# Patient Record
Sex: MALE | Race: WHITE | NOT HISPANIC OR LATINO | Employment: FULL TIME | URBAN - METROPOLITAN AREA
[De-identification: names, ages, dates, MRNs, and addresses within clinical notes are randomized per-mention and may not be internally consistent; named-entity substitution may affect disease eponyms.]

---

## 2022-06-13 ENCOUNTER — OFFICE VISIT (OUTPATIENT)
Dept: URGENT CARE | Facility: PHYSICIAN GROUP | Age: 22
End: 2022-06-13
Payer: COMMERCIAL

## 2022-06-13 VITALS
HEIGHT: 72 IN | HEART RATE: 90 BPM | BODY MASS INDEX: 29.74 KG/M2 | DIASTOLIC BLOOD PRESSURE: 76 MMHG | SYSTOLIC BLOOD PRESSURE: 106 MMHG | WEIGHT: 219.6 LBS | RESPIRATION RATE: 20 BRPM | TEMPERATURE: 97.8 F | OXYGEN SATURATION: 97 %

## 2022-06-13 DIAGNOSIS — J98.8 VIRAL RESPIRATORY ILLNESS: ICD-10-CM

## 2022-06-13 DIAGNOSIS — B97.89 VIRAL RESPIRATORY ILLNESS: ICD-10-CM

## 2022-06-13 PROCEDURE — 99203 OFFICE O/P NEW LOW 30 MIN: CPT | Performed by: PHYSICIAN ASSISTANT

## 2022-06-13 RX ORDER — DEXAMETHASONE SODIUM PHOSPHATE 10 MG/ML
10 INJECTION INTRAMUSCULAR; INTRAVENOUS ONCE
Status: COMPLETED | OUTPATIENT
Start: 2022-06-13 | End: 2022-06-13

## 2022-06-13 RX ADMIN — DEXAMETHASONE SODIUM PHOSPHATE 10 MG: 10 INJECTION INTRAMUSCULAR; INTRAVENOUS at 09:56

## 2022-06-13 ASSESSMENT — ENCOUNTER SYMPTOMS
NAUSEA: 0
WHEEZING: 0
EYE DISCHARGE: 0
COUGH: 0
EYE REDNESS: 0
DIAPHORESIS: 0
CONSTIPATION: 0
EYE PAIN: 0
SORE THROAT: 1
VOMITING: 0
SINUS PAIN: 0
ABDOMINAL PAIN: 0
DIARRHEA: 0
EYES NEGATIVE: 1
FEVER: 0
CARDIOVASCULAR NEGATIVE: 1
HEADACHES: 1
SHORTNESS OF BREATH: 0
DIZZINESS: 0
CHILLS: 0

## 2022-06-13 NOTE — LETTER
[facility] [place]  [address]  [place] [address]     June 13, 2022    Patient: Elvis Cordova Jr.   YOB: 2000   Date of Visit: 6/13/2022       To Whom It May Concern:    Elvis Cordova was seen and treated in our department on 6/13/2022. Please excuse from work between 6/10/2022-6/14/2022. Can return to work on the morning of 6/15/2022.    Sincerely,     Jose Manuel Golden P.A.-C.

## 2022-06-13 NOTE — PROGRESS NOTES
Subjective:     Elvis Cordova Jr.  is a 21 y.o. male who presents for Cough (Sore throat, runny nose, headache, sore back,  x4 days )       He presents today with sore throat, rhinorrhea, headache, body aches that have been ongoing since 6/10/2022.  He notes that his mother has similar symptoms to him.  He did take an at home COVID test yesterday, negative.  Does have a history of asthma.  Denies fever/chills/sweats, chest pain shortness of breath, abdominal pain, nausea/vomiting, constipation/diarrhea.  He is needing a work note to return to work.     Review of Systems   Constitutional: Positive for malaise/fatigue. Negative for chills, diaphoresis and fever.   HENT: Positive for sore throat. Negative for congestion, ear discharge and sinus pain.    Eyes: Negative.  Negative for pain, discharge and redness.   Respiratory: Negative for cough, shortness of breath and wheezing.    Cardiovascular: Negative.  Negative for chest pain.   Gastrointestinal: Negative for abdominal pain, constipation, diarrhea, nausea and vomiting.   Genitourinary: Negative for dysuria, frequency and urgency.   Neurological: Positive for headaches. Negative for dizziness.      Not on File  History reviewed. No pertinent past medical history.     Objective:   /76 (BP Location: Left arm, Patient Position: Sitting, BP Cuff Size: Adult)   Pulse 90   Temp 36.6 °C (97.8 °F) (Temporal)   Resp 20   Ht 1.829 m (6')   Wt 99.6 kg (219 lb 9.6 oz)   SpO2 97%   BMI 29.78 kg/m²   Physical Exam  Vitals and nursing note reviewed.   Constitutional:       General: He is not in acute distress.     Appearance: Normal appearance. He is not ill-appearing, toxic-appearing or diaphoretic.   HENT:      Head: Normocephalic.      Right Ear: Tympanic membrane, ear canal and external ear normal. There is no impacted cerumen.      Left Ear: Tympanic membrane, ear canal and external ear normal. There is no impacted cerumen.      Nose: No congestion or  rhinorrhea.      Mouth/Throat:      Mouth: Mucous membranes are moist.      Pharynx: Posterior oropharyngeal erythema present. No oropharyngeal exudate.   Eyes:      General:         Right eye: No discharge.         Left eye: No discharge.      Conjunctiva/sclera: Conjunctivae normal.   Cardiovascular:      Rate and Rhythm: Normal rate and regular rhythm.   Pulmonary:      Effort: Pulmonary effort is normal. No respiratory distress.      Breath sounds: Normal breath sounds. No stridor. No wheezing or rhonchi.   Musculoskeletal:      Cervical back: Neck supple.   Lymphadenopathy:      Cervical: No cervical adenopathy.   Neurological:      General: No focal deficit present.      Mental Status: He is alert and oriented to person, place, and time.   Psychiatric:         Mood and Affect: Mood normal.         Behavior: Behavior normal.         Thought Content: Thought content normal.         Judgment: Judgment normal.             Diagnostic testing: None    Assessment/Plan:     Encounter Diagnoses   Name Primary?   • Viral respiratory illness         Plan for care for today's complaint includes Decadron drink in office.  At this time patient does decline COVID test as he did have a negative test at home yesterday.  I do not feel that a influenza swab would be needed at this time as he is outside the treatment window for antiviral medications for influenza.  Did provide a work note to the patient today.  He should continue to monitor his symptoms, if they are not improved in the next 7-10 days or they begin to significantly worsen that he should return to the urgent care or emergency department for reevaluation.    See AVS Instructions below for written guidance provided to patient on after-visit management and care in addition to our verbal discussion during the visit.    Please note that this dictation was created using voice recognition software. I have attempted to correct all errors, but there may be sound-alike,  spelling, grammar and possibly content errors that I did not discover before finalizing the note.    Emetvaleria Golden PA-C

## 2022-08-26 ENCOUNTER — OFFICE VISIT (OUTPATIENT)
Dept: URGENT CARE | Facility: PHYSICIAN GROUP | Age: 22
End: 2022-08-26
Payer: COMMERCIAL

## 2022-08-26 VITALS
DIASTOLIC BLOOD PRESSURE: 80 MMHG | HEIGHT: 72 IN | WEIGHT: 213.4 LBS | OXYGEN SATURATION: 98 % | TEMPERATURE: 98.4 F | RESPIRATION RATE: 16 BRPM | SYSTOLIC BLOOD PRESSURE: 104 MMHG | HEART RATE: 82 BPM | BODY MASS INDEX: 28.91 KG/M2

## 2022-08-26 DIAGNOSIS — R11.0 NAUSEA: ICD-10-CM

## 2022-08-26 DIAGNOSIS — A08.4 VIRAL GASTROENTERITIS: ICD-10-CM

## 2022-08-26 PROCEDURE — 99213 OFFICE O/P EST LOW 20 MIN: CPT | Performed by: NURSE PRACTITIONER

## 2022-08-26 RX ORDER — ONDANSETRON 4 MG/1
4 TABLET, ORALLY DISINTEGRATING ORAL EVERY 6 HOURS PRN
Qty: 15 TABLET | Refills: 0 | Status: SHIPPED | OUTPATIENT
Start: 2022-08-26 | End: 2022-09-09

## 2022-08-26 NOTE — LETTER
August 26, 2022    To Whom It May Concern:         This is confirmation that Elvis Cordova Jr. attended his scheduled appointment with BEVERLY Wagner on 8/26/22. Please excuse 8/24-8/26/22. <ay return 8/29/22.          If you have any questions please do not hesitate to call me at the phone number listed below.    Sincerely,          DEMI Wagner.  [phone]

## 2022-08-26 NOTE — PROGRESS NOTES
Patient has consented to treatment and for use of patient information for treatment and billing purposes.    Date: 08/26/22     Arrival Mode: Private Vehicle    Chief Complaint:    Chief Complaint   Patient presents with    GI Problem     Nausea,headaches,stomach ache for a couple days        Patient's HPI was provided by patient       History of Present Illness: 21 y.o.  male presents to clinic with 3-day history of nausea, stomachache, body aches and mild headache.  This is not the worst headache of the patient's life.  Patient states that an empty stomach causes abdominal cramping and causes the ache.  Patient states when he is able to eat a little bit it does help with the stomach cramping. Patient states he did have to miss work for 3 days and needs a work note.  Patient states that he does not have any focal abdominal pain.   No diarrhea last bowel movement yesterday was not bloody or black and was normal for him.  Denies any constipation.  Denies any vomiting.  No decreased urination or dysuria.  No known sick contacts no drinking from well [place] streams or recent camping.  No questionable foods or recent travel.       ROS:      As per hpi     Pertinent Medical History:  No past medical history on file.     Pertinent Surgical History:  No past surgical history on file.     Pertinent Medications:    No current outpatient medications on file prior to visit.     No current facility-administered medications on file prior to visit.        Allergies:    Patient has no allergy information on record.     Social History:  Social History     Tobacco Use    Smoking status: Never    Smokeless tobacco: Never        No LMP for male patient.         Physical Exam:    Vitals:    08/26/22 0953   BP: 104/80   Pulse: 82   Temp: 36.9 °C (98.4 °F)   SpO2: 98%             Physical Exam  Constitutional:       General: He is awake.      Appearance: Normal appearance. He is not ill-appearing, toxic-appearing or diaphoretic.   HENT:       Head: Normocephalic and atraumatic.      Right Ear: Tympanic membrane, ear canal and external ear normal.      Left Ear: Tympanic membrane, ear canal and external ear normal.   Eyes:      General: Lids are normal. Gaze aligned appropriately. No allergic shiner or scleral icterus.     Extraocular Movements: Extraocular movements intact.      Conjunctiva/sclera: Conjunctivae normal.      Pupils: Pupils are equal, round, and reactive to light.   Cardiovascular:      Rate and Rhythm: Normal rate and regular rhythm.      Pulses:           Radial pulses are 2+ on the right side and 2+ on the left side.      Heart sounds: Normal heart sounds.   Pulmonary:      Effort: Pulmonary effort is normal.      Breath sounds: Normal breath sounds and air entry. No decreased breath sounds, wheezing, rhonchi or rales.   Abdominal:      General: Abdomen is flat. Bowel sounds are normal.      Palpations: Abdomen is soft.      Tenderness: There is no abdominal tenderness. There is no right CVA tenderness, left CVA tenderness, guarding or rebound.   Musculoskeletal:      Right lower leg: No edema.      Left lower leg: No edema.   Lymphadenopathy:      Cervical: No cervical adenopathy.   Skin:     General: Skin is warm.      Capillary Refill: Capillary refill takes less than 2 seconds.      Coloration: Skin is not cyanotic or pale.   Neurological:      Mental Status: He is alert and oriented to person, place, and time.      Gait: Gait is intact.   Psychiatric:         Behavior: Behavior normal. Behavior is cooperative.          Diagnostics:    None       Medical Decision making and clinic course :    Shared decision-making was utilized with patient for treatment plan.  Discussed likely viral etiology as a cause of symptoms although did discuss other differentials to include acute abdomen, ulcer, food intolerance and/or cholecystitis.  Do have low suspicion for these differentials as exam findings are reassuring as well as HPI.   Fortunately patient does have a primary care follow-up in approximately 1 week's time.  We will send for Zofran for nausea encouraged bland diet and electrolyte replacement drinks.  Did give anticipatory guidance that he may begin to develop diarrhea and encouraged him to increase fluids at that time.        The patient remained stable during the urgent care visit.    Plan:    Medication discussed included indication for use and the potential benefits and side effects.       1. Nausea    - ondansetron (ZOFRAN ODT) 4 MG TABLET DISPERSIBLE; Take 1 Tablet by mouth every 6 hours as needed for Nausea for up to 15 doses.  Dispense: 15 Tablet; Refill: 0    2. Viral gastroenteritis        Printed education was provided regarding the aforementioned assessments.  All of the patient's questions were answered to their satisfaction at the time of discharge.    Follow up:    Recommended f/u in  3 days if there is no improvement.    Patient was encouraged to monitor symptoms closely. Those signs and symptoms which would warrant concern and mandate seeking a higher level of service through the emergency department discussed at length and included in discharge papers.  Patient stated agreement and understanding of this plan of care.    Disposition:  Home in stable condition       Voice Recognition Disclaimer:  Portions of this document were created using voice recognition software. The software does have a chance of producing errors of grammar and possibly content. I have made every reasonable attempt to correct obvious errors, but there may be errors of grammar and possibly content that I did not discover before finalizing the documentation.    DEMI Wagner.

## 2022-09-09 ENCOUNTER — OFFICE VISIT (OUTPATIENT)
Dept: MEDICAL GROUP | Facility: PHYSICIAN GROUP | Age: 22
End: 2022-09-09
Payer: COMMERCIAL

## 2022-09-09 VITALS
WEIGHT: 217.4 LBS | BODY MASS INDEX: 29.45 KG/M2 | DIASTOLIC BLOOD PRESSURE: 78 MMHG | OXYGEN SATURATION: 100 % | HEIGHT: 72 IN | TEMPERATURE: 98.2 F | HEART RATE: 80 BPM | RESPIRATION RATE: 16 BRPM | SYSTOLIC BLOOD PRESSURE: 110 MMHG

## 2022-09-09 DIAGNOSIS — Z00.00 WELLNESS EXAMINATION: ICD-10-CM

## 2022-09-09 DIAGNOSIS — Z23 NEED FOR VACCINATION: ICD-10-CM

## 2022-09-09 DIAGNOSIS — J45.20 MILD INTERMITTENT ASTHMA WITHOUT COMPLICATION: ICD-10-CM

## 2022-09-09 PROCEDURE — 90715 TDAP VACCINE 7 YRS/> IM: CPT

## 2022-09-09 PROCEDURE — 90472 IMMUNIZATION ADMIN EACH ADD: CPT

## 2022-09-09 PROCEDURE — 90471 IMMUNIZATION ADMIN: CPT

## 2022-09-09 PROCEDURE — 90651 9VHPV VACCINE 2/3 DOSE IM: CPT

## 2022-09-09 PROCEDURE — 99395 PREV VISIT EST AGE 18-39: CPT | Mod: 25

## 2022-09-09 RX ORDER — ALBUTEROL SULFATE 90 UG/1
2 AEROSOL, METERED RESPIRATORY (INHALATION) EVERY 6 HOURS PRN
Qty: 8.5 G | Refills: 1 | Status: SHIPPED | OUTPATIENT
Start: 2022-09-09

## 2022-09-09 ASSESSMENT — ENCOUNTER SYMPTOMS
HEARTBURN: 0
NERVOUS/ANXIOUS: 0
HEADACHES: 0
DIARRHEA: 0
CHILLS: 0
SHORTNESS OF BREATH: 0
COUGH: 0
WEAKNESS: 0
MYALGIAS: 0
ABDOMINAL PAIN: 0
NECK PAIN: 0
CONSTIPATION: 0
PALPITATIONS: 0
BACK PAIN: 0
FEVER: 0
DEPRESSION: 0
WHEEZING: 0

## 2022-09-09 ASSESSMENT — PATIENT HEALTH QUESTIONNAIRE - PHQ9: CLINICAL INTERPRETATION OF PHQ2 SCORE: 0

## 2022-09-09 NOTE — ASSESSMENT & PLAN NOTE
Chronic condition since childhood, very seldom has any symptoms now. Has not had any difficulty breathing, wheezing, or chest tightness in over 3 years. Only notices with URI.

## 2022-09-09 NOTE — PROGRESS NOTES
Subjective:     CC: Patient presents to establish care.    HPI:   Elvis presents today with    Problem   Mild Intermittent Asthma Without Complication       Health Maintenance: Tdap and HPV vaccines provided, will follow up for Hep B and Men B  Diet: variety of fresh veggies/fruits, lean meats, limited fast food/junk food/soda  Exercise: encouraged 150 minutes/week.   Substance Abuse: no  Safe in relationship. N/a  Seat belts, bike helmet, gun safety discussed.  Sun protection used.    ROS:  Review of Systems   Constitutional:  Negative for chills and fever.   Respiratory:  Negative for cough, shortness of breath and wheezing.    Cardiovascular:  Negative for chest pain, palpitations and leg swelling.   Gastrointestinal:  Negative for abdominal pain, constipation, diarrhea and heartburn.   Musculoskeletal:  Negative for back pain, myalgias and neck pain.   Skin:  Negative for rash.   Neurological:  Negative for weakness and headaches.   Psychiatric/Behavioral:  Negative for depression. The patient is not nervous/anxious.    All other systems reviewed and are negative.    Objective:     Exam:  /78 (BP Location: Right arm, Patient Position: Sitting, BP Cuff Size: Small adult)   Pulse 80   Temp 36.8 °C (98.2 °F) (Temporal)   Resp 16   Ht 1.829 m (6')   Wt 98.6 kg (217 lb 6.4 oz)   SpO2 100%   BMI 29.48 kg/m²  Body mass index is 29.48 kg/m².    Physical Exam  Vitals reviewed.   Constitutional:       General: He is not in acute distress.     Appearance: Normal appearance. He is not ill-appearing.   HENT:      Head: Normocephalic and atraumatic.      Right Ear: Tympanic membrane, ear canal and external ear normal.      Left Ear: Tympanic membrane, ear canal and external ear normal.      Nose:      Comments: Mask in place     Mouth/Throat:      Comments: Mask in place  Eyes:      Extraocular Movements: Extraocular movements intact.      Conjunctiva/sclera: Conjunctivae normal.      Pupils: Pupils are equal,  round, and reactive to light.   Cardiovascular:      Rate and Rhythm: Normal rate and regular rhythm.      Pulses: Normal pulses.      Heart sounds: Normal heart sounds. No murmur heard.    No gallop.   Pulmonary:      Effort: Pulmonary effort is normal. No respiratory distress.      Breath sounds: Normal breath sounds. No wheezing or rales.   Abdominal:      General: Bowel sounds are normal.      Palpations: Abdomen is soft.   Musculoskeletal:         General: Normal range of motion.      Cervical back: Normal range of motion and neck supple.   Lymphadenopathy:      Cervical: No cervical adenopathy.   Skin:     General: Skin is warm and dry.      Capillary Refill: Capillary refill takes less than 2 seconds.   Neurological:      General: No focal deficit present.      Mental Status: He is alert and oriented to person, place, and time.      Motor: No weakness.   Psychiatric:         Mood and Affect: Mood normal.         Behavior: Behavior normal.       Assessment & Plan:     22 y.o. male with the following -     Problem List Items Addressed This Visit       Mild intermittent asthma without complication     Chronic condition since childhood, very seldom has any symptoms now. Has not had any difficulty breathing, wheezing, or chest tightness in over 3 years. Only notices with URI.         Relevant Medications    albuterol 108 (90 Base) MCG/ACT Aero Soln inhalation aerosol     Other Visit Diagnoses       Wellness examination        Need for vaccination        Relevant Orders    Gardasil 9    Tdap =>6yo IM          I spent a total of 23 minutes with record review, exam, communication with the patient, communication with other providers, and documentation of this encounter.    I have placed the below orders and discussed them with an approved delegating provider.  The MA is performing the below orders under the direction of Dr. Galeano.     Return in about 4 weeks (around 10/7/2022) for vaccines.    Please note that this  dictation was created using voice recognition software. I have made every reasonable attempt to correct obvious errors, but I expect that there are errors of grammar and possibly content that I did not discover before finalizing the note.

## 2022-10-10 ENCOUNTER — NON-PROVIDER VISIT (OUTPATIENT)
Dept: MEDICAL GROUP | Facility: PHYSICIAN GROUP | Age: 22
End: 2022-10-10
Payer: COMMERCIAL

## 2022-10-10 DIAGNOSIS — Z23 NEED FOR VACCINATION: ICD-10-CM

## 2022-10-10 PROCEDURE — 90651 9VHPV VACCINE 2/3 DOSE IM: CPT

## 2022-10-10 PROCEDURE — 90471 IMMUNIZATION ADMIN: CPT

## 2022-10-10 PROCEDURE — 90677 PCV20 VACCINE IM: CPT

## 2022-10-10 PROCEDURE — 90621 MENB-FHBP VACC 2/3 DOSE IM: CPT

## 2022-10-10 PROCEDURE — 90472 IMMUNIZATION ADMIN EACH ADD: CPT

## 2022-10-10 NOTE — PROGRESS NOTES
"Elvis Cordova Jr. is a 22 y.o. male here for a non-provider visit for:   HPV 1 of 1, Trumemba, Pneumococcal 20    Reason for immunization: Overdue/Provider Recommended  Immunization records indicate need for vaccine: Yes, confirmed with Epic  Minimum interval has been met for this vaccine: Yes  ABN completed: Not Indicated    VIS Dated  10/10/2022 was given to patient: Yes  All IAC Questionnaire questions were answered \"No.\"    Patient tolerated injection and no adverse effects were observed or reported: Yes    Pt scheduled for next dose in series: Not Indicated   "

## 2023-06-13 ENCOUNTER — OFFICE VISIT (OUTPATIENT)
Dept: URGENT CARE | Facility: PHYSICIAN GROUP | Age: 23
End: 2023-06-13
Payer: COMMERCIAL

## 2023-06-13 VITALS
SYSTOLIC BLOOD PRESSURE: 102 MMHG | HEIGHT: 72 IN | RESPIRATION RATE: 16 BRPM | OXYGEN SATURATION: 97 % | HEART RATE: 90 BPM | DIASTOLIC BLOOD PRESSURE: 70 MMHG | WEIGHT: 212 LBS | TEMPERATURE: 98.1 F | BODY MASS INDEX: 28.71 KG/M2

## 2023-06-13 DIAGNOSIS — J06.9 VIRAL URI: ICD-10-CM

## 2023-06-13 PROCEDURE — 3078F DIAST BP <80 MM HG: CPT | Performed by: PHYSICIAN ASSISTANT

## 2023-06-13 PROCEDURE — 99213 OFFICE O/P EST LOW 20 MIN: CPT | Performed by: PHYSICIAN ASSISTANT

## 2023-06-13 PROCEDURE — 3074F SYST BP LT 130 MM HG: CPT | Performed by: PHYSICIAN ASSISTANT

## 2023-06-13 ASSESSMENT — ENCOUNTER SYMPTOMS
TROUBLE SWALLOWING: 0
SHORTNESS OF BREATH: 0
SWOLLEN GLANDS: 0
HEADACHES: 1

## 2023-06-13 NOTE — PROGRESS NOTES
Subjective:   Elvis Cordova Jr. is a 22 y.o. male who presents for Sore Throat (Feeling tired, runny nose, clogged ear, fever, x5 days )        Pharyngitis   This is a new problem. Episode onset: 5 days. The problem has been waxing and waning. The maximum temperature recorded prior to his arrival was 101 - 101.9 F (first 2 days. Tmax in last 2 days 99F.). The fever has been present for 1 to 2 days. Associated symptoms include congestion, headaches and a plugged ear sensation. Pertinent negatives include no drooling, ear pain, shortness of breath, swollen glands or trouble swallowing. Associated symptoms comments: No pain with swallowing. +fatigue. He has had no exposure to strep. Treatments tried: dayquil, nyquil. The treatment provided significant relief.     Review of Systems   HENT:  Positive for congestion. Negative for drooling, ear pain and trouble swallowing.    Respiratory:  Negative for shortness of breath.    Neurological:  Positive for headaches.       PMH:  has no past medical history on file.  MEDS:   Current Outpatient Medications:     albuterol 108 (90 Base) MCG/ACT Aero Soln inhalation aerosol, Inhale 2 Puffs every 6 hours as needed for Shortness of Breath., Disp: 8.5 g, Rfl: 1  ALLERGIES: No Known Allergies  SURGHX: History reviewed. No pertinent surgical history.  SOCHX:  reports that he has never smoked. He has never used smokeless tobacco. He reports that he does not drink alcohol and does not use drugs.  FH: Family history was reviewed, no pertinent findings to report   Objective:   /70 (BP Location: Left arm, Patient Position: Sitting, BP Cuff Size: Adult)   Pulse 90   Temp 36.7 °C (98.1 °F) (Temporal)   Resp 16   Ht 1.829 m (6')   Wt 96.2 kg (212 lb)   SpO2 97%   BMI 28.75 kg/m²   Physical Exam  Vitals reviewed.   Constitutional:       General: He is not in acute distress.     Appearance: Normal appearance. He is well-developed. He is not toxic-appearing.   HENT:      Head:  Normocephalic and atraumatic.      Right Ear: Tympanic membrane, ear canal and external ear normal.      Left Ear: Tympanic membrane, ear canal and external ear normal.      Nose: Congestion and rhinorrhea present.      Mouth/Throat:      Lips: Pink.      Mouth: Mucous membranes are moist.      Pharynx: Oropharynx is clear. Uvula midline. No posterior oropharyngeal erythema.      Tonsils: No tonsillar exudate.   Cardiovascular:      Rate and Rhythm: Normal rate and regular rhythm.      Heart sounds: Normal heart sounds.   Pulmonary:      Effort: Pulmonary effort is normal. No respiratory distress.      Breath sounds: Normal breath sounds. No stridor. No decreased breath sounds, wheezing, rhonchi or rales.   Skin:     General: Skin is dry.   Neurological:      Comments: Alert and oriented.    Psychiatric:         Speech: Speech normal.         Behavior: Behavior normal.           Assessment/Plan:   1. Viral URI  .    Considerations include but not limited to viral URI, sinusitis, allergic rhinitis, influenza, COVID-19. No evidence of lower respiratory involvement on exam today. Clinical suspicion for GAS low at this time. Viral testing unlikely to change my MDM at this point in time.    Recommend symptomatic care.  Continue nyquil and dayquil prn.  May also perform nasal saline rinses 2-3 times a day and begin Flonase daily.  Recommend salt water gargles, lozenges, hot tea with honey, and ibuprofen as needed for sore throat.  Tylenol or ibuprofen as needed for fever control, body aches, and headaches.  Ensure adequate rest and hydration.    If symptoms fail to improve within 72 hours, new symptoms develop, symptoms worsen return to clinic or see PCP for reevaluation.

## 2023-06-13 NOTE — LETTER
[facility] [place]  [address]  [place] [address]     June 13, 2023    Patient: Elvis Cordova Jr.   YOB: 2000   Date of Visit: 6/13/2023       To Whom It May Concern:    Elvis Cordova was seen and treated in our department on 6/13/2023.  Please excuse him from work on 6/12-6/16/23.    Sincerely,     Roland Flowers P.A.-C.

## 2023-06-26 ENCOUNTER — OFFICE VISIT (OUTPATIENT)
Dept: MEDICAL GROUP | Facility: PHYSICIAN GROUP | Age: 23
End: 2023-06-26
Payer: COMMERCIAL

## 2023-06-26 VITALS
SYSTOLIC BLOOD PRESSURE: 102 MMHG | DIASTOLIC BLOOD PRESSURE: 70 MMHG | HEIGHT: 72 IN | HEART RATE: 71 BPM | WEIGHT: 212 LBS | TEMPERATURE: 97.6 F | OXYGEN SATURATION: 97 % | BODY MASS INDEX: 28.71 KG/M2

## 2023-06-26 DIAGNOSIS — Z23 NEED FOR VACCINATION: ICD-10-CM

## 2023-06-26 DIAGNOSIS — Z86.19 H/O VIRAL ILLNESS: ICD-10-CM

## 2023-06-26 DIAGNOSIS — Z11.59 NEED FOR HEPATITIS C SCREENING TEST: ICD-10-CM

## 2023-06-26 DIAGNOSIS — Z02.89 ENCOUNTER FOR COMPLETION OF FORM WITH PATIENT: ICD-10-CM

## 2023-06-26 PROCEDURE — 99213 OFFICE O/P EST LOW 20 MIN: CPT | Mod: 25

## 2023-06-26 PROCEDURE — 90472 IMMUNIZATION ADMIN EACH ADD: CPT

## 2023-06-26 PROCEDURE — 90651 9VHPV VACCINE 2/3 DOSE IM: CPT

## 2023-06-26 PROCEDURE — 3078F DIAST BP <80 MM HG: CPT

## 2023-06-26 PROCEDURE — 90471 IMMUNIZATION ADMIN: CPT

## 2023-06-26 PROCEDURE — 3074F SYST BP LT 130 MM HG: CPT

## 2023-06-26 PROCEDURE — 90621 MENB-FHBP VACC 2/3 DOSE IM: CPT

## 2023-06-26 ASSESSMENT — PATIENT HEALTH QUESTIONNAIRE - PHQ9: CLINICAL INTERPRETATION OF PHQ2 SCORE: 0

## 2023-06-26 NOTE — PROGRESS NOTES
Subjective:     CC: Diagnoses of Need for vaccination, H/O viral illness, Need for hepatitis C screening test, and Encounter for completion of form with patient were pertinent to this visit.    Patient presents today for FMLA paperwork for her missed days of work secondary to URI viral.  He was seen in urgent care for this 6/13/2023.  His employer allows 3 days off, however he required 6 for resolution of symptoms.  Today he is feeling improved without any residual complaints.    HPI:   Elvis presents today with    Problem   H/O Viral Illness     ROS:  Review of Systems   All other systems reviewed and are negative.      Objective:     Exam:  /70 (BP Location: Right arm, Patient Position: Sitting, BP Cuff Size: Adult)   Pulse 71   Temp 36.4 °C (97.6 °F) (Temporal)   Ht 1.829 m (6')   Wt 96.2 kg (212 lb)   SpO2 97%   BMI 28.75 kg/m²  Body mass index is 28.75 kg/m².    Physical Exam  Constitutional:       General: He is not in acute distress.     Appearance: He is not ill-appearing.   HENT:      Head: Normocephalic and atraumatic.      Nose: Nose normal.      Mouth/Throat:      Mouth: Mucous membranes are moist.      Pharynx: Oropharynx is clear.   Eyes:      Extraocular Movements: Extraocular movements intact.      Conjunctiva/sclera: Conjunctivae normal.      Pupils: Pupils are equal, round, and reactive to light.   Neck:      Thyroid: No thyromegaly.      Trachea: Trachea normal.   Cardiovascular:      Rate and Rhythm: Normal rate and regular rhythm.      Pulses: Normal pulses.      Heart sounds: Normal heart sounds. No murmur heard.  Pulmonary:      Effort: Pulmonary effort is normal. No respiratory distress.      Breath sounds: Normal breath sounds. No wheezing.   Musculoskeletal:      Cervical back: Full passive range of motion without pain.   Lymphadenopathy:      Cervical: No cervical adenopathy.   Skin:     General: Skin is warm and dry.      Capillary Refill: Capillary refill takes less than 2  seconds.   Neurological:      General: No focal deficit present.      Mental Status: He is alert and oriented to person, place, and time.   Psychiatric:         Mood and Affect: Mood normal.         Behavior: Behavior normal.       Assessment & Plan:     22 y.o. male with the following -     Problem List Items Addressed This Visit       H/O viral illness     URI with symptom onset 6/8/2023, resolution of symptoms and returned to work 6/18/2023.  visit with viral URI 6/13/23          Other Visit Diagnoses       Need for vaccination        Relevant Orders    Meningococcal Vaccine Serogroup B 2-3 Dose (TRUMENBA)    9VHPV Vaccine 2-3 Dose (GARDASIL 9)    Need for hepatitis C screening test        Relevant Orders    HEP C VIRUS ANTIBODY    Encounter for completion of form with patient              Supportive care, differential diagnoses, and indications for immediate follow-up discussed with patient.    Pathogenesis of diagnosis discussed including typical length and natural progression.    Instructed to return to clinic or nearest emergency department for any change in condition, further concerns, or worsening of symptoms.  Patient states understanding of the plan of care and discharge instructions.    Return in about 3 months (around 9/26/2023), or if symptoms worsen or fail to improve, for Labs, wellness.    Please note that this dictation was created using voice recognition software. I have made every reasonable attempt to correct obvious errors, but I expect that there are errors of grammar and possibly content that I did not discover before finalizing the note.

## 2023-06-26 NOTE — ASSESSMENT & PLAN NOTE
URI with symptom onset 6/8/2023, resolution of symptoms and returned to work 6/18/2023. UC visit with viral URI 6/13/23

## 2023-09-22 DIAGNOSIS — U07.1 COVID-19: ICD-10-CM

## 2024-03-30 ENCOUNTER — OFFICE VISIT (OUTPATIENT)
Dept: URGENT CARE | Facility: PHYSICIAN GROUP | Age: 24
End: 2024-03-30
Payer: COMMERCIAL

## 2024-03-30 VITALS
WEIGHT: 198 LBS | SYSTOLIC BLOOD PRESSURE: 124 MMHG | HEIGHT: 72 IN | OXYGEN SATURATION: 96 % | RESPIRATION RATE: 20 BRPM | TEMPERATURE: 98.2 F | DIASTOLIC BLOOD PRESSURE: 80 MMHG | HEART RATE: 84 BPM | BODY MASS INDEX: 26.82 KG/M2

## 2024-03-30 DIAGNOSIS — K52.9 AGE (ACUTE GASTROENTERITIS): ICD-10-CM

## 2024-03-30 ASSESSMENT — ENCOUNTER SYMPTOMS
NAUSEA: 0
WEIGHT LOSS: 0
EYE DISCHARGE: 0
MYALGIAS: 0
VOMITING: 0
EYE REDNESS: 0

## 2024-03-30 NOTE — LETTER
March 30, 2024         Patient: Elvis Cordova Jr.   YOB: 2000   Date of Visit: 3/30/2024           To Whom it May Concern:    Elvis Cordova was seen in my clinic on 3/30/2024. Please excuse from work 3/26 through 3/29/2024. He may return to his next scheduled shift 4/1/2024.     Sincerely,           Kwaku Jarvis M.D.  Electronically Signed

## 2024-03-30 NOTE — PROGRESS NOTES
Subjective     Elvis Cordova Jr. is a 23 y.o. male who presents with GI Problem (4 DAYS ), Nausea (4 days ), and Diarrhea (1 day )            4 days nausea. Lightheaded. HA. No vomiting. Watery diarrhea without blood. Initial fever resolved. Symptoms are improving. Tolerating fluids with normal urine output. Intermittent cramping abdominal pain, none currently. No recent foreign travel, camping, or abx use.No other aggravating or alleviating factors.          Review of Systems   Constitutional:  Negative for malaise/fatigue and weight loss.   Eyes:  Negative for discharge and redness.   Gastrointestinal:  Negative for nausea and vomiting.   Musculoskeletal:  Negative for joint pain and myalgias.   Skin:  Negative for itching and rash.              Objective     /80   Pulse 84   Temp 36.8 °C (98.2 °F) (Temporal)   Resp 20   Ht 1.829 m (6')   Wt 89.8 kg (198 lb)   SpO2 96%   BMI 26.85 kg/m²      Physical Exam  Constitutional:       General: He is not in acute distress.     Appearance: He is well-developed.   HENT:      Head: Normocephalic and atraumatic.      Mouth/Throat:      Mouth: Mucous membranes are moist.   Eyes:      Conjunctiva/sclera: Conjunctivae normal.   Cardiovascular:      Rate and Rhythm: Normal rate and regular rhythm.      Heart sounds: Normal heart sounds. No murmur heard.  Pulmonary:      Effort: Pulmonary effort is normal.      Breath sounds: Normal breath sounds. No wheezing.   Abdominal:      General: Bowel sounds are normal.      Palpations: Abdomen is soft.      Tenderness: There is no abdominal tenderness.   Skin:     General: Skin is warm and dry.      Findings: No rash.   Neurological:      Mental Status: He is alert.                             Assessment & Plan         1. AGE (acute gastroenteritis)          Differential diagnosis, natural history, supportive care, and indications for immediate follow-up were discussed.     Resolving. Work note written.

## 2024-05-10 ENCOUNTER — APPOINTMENT (OUTPATIENT)
Dept: RADIOLOGY | Facility: IMAGING CENTER | Age: 24
End: 2024-05-10
Payer: COMMERCIAL

## 2024-05-10 ENCOUNTER — OFFICE VISIT (OUTPATIENT)
Dept: URGENT CARE | Facility: PHYSICIAN GROUP | Age: 24
End: 2024-05-10
Payer: COMMERCIAL

## 2024-05-10 VITALS
TEMPERATURE: 98.9 F | RESPIRATION RATE: 20 BRPM | HEIGHT: 72 IN | WEIGHT: 228 LBS | HEART RATE: 110 BPM | SYSTOLIC BLOOD PRESSURE: 120 MMHG | DIASTOLIC BLOOD PRESSURE: 64 MMHG | BODY MASS INDEX: 30.88 KG/M2 | OXYGEN SATURATION: 99 %

## 2024-05-10 DIAGNOSIS — M79.672 LEFT FOOT PAIN: ICD-10-CM

## 2024-05-10 PROCEDURE — 3078F DIAST BP <80 MM HG: CPT

## 2024-05-10 PROCEDURE — 73630 X-RAY EXAM OF FOOT: CPT | Mod: TC,LT

## 2024-05-10 PROCEDURE — 99213 OFFICE O/P EST LOW 20 MIN: CPT

## 2024-05-10 PROCEDURE — 3074F SYST BP LT 130 MM HG: CPT

## 2024-05-10 RX ORDER — PREDNISONE 20 MG/1
20 TABLET ORAL 2 TIMES DAILY
Qty: 6 TABLET | Refills: 0 | Status: SHIPPED | OUTPATIENT
Start: 2024-05-10 | End: 2024-05-13

## 2024-05-10 ASSESSMENT — ENCOUNTER SYMPTOMS
FEVER: 0
CHILLS: 0

## 2024-05-11 NOTE — PROGRESS NOTES
Chief Complaint   Patient presents with    Leg Pain     Left leg px, bump on left foot, hurts to walk x5 days       HISTORY OF PRESENT ILLNESS: Patient is a pleasant 23 y.o. male who presents to urgent care today ongoing left foot pain for the last 5 days, hurts especially with walking.  Patient denies any known trauma.  He has not taken anything for this.    Patient Active Problem List    Diagnosis Date Noted    H/O viral illness 06/26/2023    Mild intermittent asthma without complication 09/09/2022       Allergies:Patient has no known allergies.    Current Outpatient Medications Ordered in Epic   Medication Sig Dispense Refill    diclofenac sodium (VOLTAREN) 1 % Gel Apply 4 g topically 4 times a day as needed (left foot pain). 100 g 0    predniSONE (DELTASONE) 20 MG Tab Take 1 Tablet by mouth 2 times a day for 3 days. 6 Tablet 0    albuterol 108 (90 Base) MCG/ACT Aero Soln inhalation aerosol Inhale 2 Puffs every 6 hours as needed for Shortness of Breath. 8.5 g 1     No current [facility]-ordered facility-administered medications on file.       No past medical history on file.    Social History     Tobacco Use    Smoking status: Never    Smokeless tobacco: Never   Vaping Use    Vaping Use: Never used   Substance Use Topics    Alcohol use: Never    Drug use: Never       Family Status   Relation Name Status    Mo  (Not Specified)    Fa  (Not Specified)    Neg Hx  (Not Specified)     Family History   Problem Relation Age of Onset    Hyperlipidemia Mother     Hypertension Father     Cancer Neg Hx     Psychiatric Illness Neg Hx     Diabetes Neg Hx     Heart Disease Neg Hx     Stroke Neg Hx        Review of Systems   Constitutional:  Negative for chills, fever and malaise/fatigue.   Musculoskeletal:  Positive for joint pain.        Left foot pain       Exam:  /64 (BP Location: Right arm, Patient Position: Sitting, BP Cuff Size: Adult)   Pulse (!) 110   Temp 37.2 °C (98.9 °F) (Temporal)   Resp 20   Ht 1.829 m (6')    Wt 103 kg (228 lb)   SpO2 99%   Physical Exam  Vitals reviewed.   Constitutional:       General: He is not in acute distress.     Appearance: Normal appearance. He is normal weight.   HENT:      Head: Normocephalic.      Right Ear: Tympanic membrane and ear canal normal. There is no impacted cerumen. Tympanic membrane is not injected or erythematous.      Left Ear: Tympanic membrane and ear canal normal. There is no impacted cerumen. Tympanic membrane is not injected or erythematous.      Mouth/Throat:      Mouth: Mucous membranes are moist.      Pharynx: Oropharynx is clear. No oropharyngeal exudate.      Tonsils: No tonsillar exudate. 0 on the right. 0 on the left.   Eyes:      General:         Right eye: No discharge.         Left eye: No discharge.      Extraocular Movements: Extraocular movements intact.      Conjunctiva/sclera: Conjunctivae normal.      Pupils: Pupils are equal, round, and reactive to light.   Cardiovascular:      Rate and Rhythm: Normal rate and regular rhythm.      Pulses: Normal pulses.      Heart sounds: Normal heart sounds. No murmur heard.  Pulmonary:      Effort: Pulmonary effort is normal. No respiratory distress.      Breath sounds: Normal breath sounds. No stridor. No wheezing.   Musculoskeletal:         General: Tenderness present. Normal range of motion.      Cervical back: Normal range of motion.      Comments: Noted tenderness with palpation to the top of the left foot, patient has full mobility of his toes, he is able to extend and rotate.  No sensory loss that I can assess.  No major redness, no major swelling   Lymphadenopathy:      Cervical: No cervical adenopathy.   Skin:     General: Skin is warm and dry.      Capillary Refill: Capillary refill takes less than 2 seconds.      Findings: No bruising or rash.   Neurological:      General: No focal deficit present.      Mental Status: He is alert.      Sensory: No sensory deficit.      Motor: No weakness.   Psychiatric:          Mood and Affect: Mood normal.         Behavior: Behavior normal.         Thought Content: Thought content normal.         Judgment: Judgment normal.         Assessment/Plan:  1. Left foot pain  - DX-FOOT-COMPLETE 3+ LEFT; Future  - diclofenac sodium (VOLTAREN) 1 % Gel; Apply 4 g topically 4 times a day as needed (left foot pain).  Dispense: 100 g; Refill: 0  - predniSONE (DELTASONE) 20 MG Tab; Take 1 Tablet by mouth 2 times a day for 3 days.  Dispense: 6 Tablet; Refill: 0    Based on physical exam along with review of systems I did order imaging today.  Patient placed on Voltaren cream as well as prednisone for comfort measures.  Reviewed plan of care at length with patient, he is aware and agreeable at this time.  Patient advised that should his pain continue to get worse and not improved please follow-up.    Notified negative images via [employer].      Supportive care, differential diagnoses, and indications for immediate follow-up discussed with patient.   Pathogenesis of diagnosis discussed including typical length and natural progression.   Instructed to return to clinic or nearest emergency department for any change in condition, further concerns, or worsening of symptoms.  Patient states understanding of the plan of care and discharge instructions.  Instructed to make an appointment, for follow up, with primary care provider.    Please note that this dictation was created using voice recognition software. I have made every reasonable attempt to correct obvious errors, but I expect that there are errors of grammar and possibly content that I did not discover before finalizing the note.      Heidi PORRAS

## 2024-09-21 ENCOUNTER — OFFICE VISIT (OUTPATIENT)
Dept: URGENT CARE | Facility: PHYSICIAN GROUP | Age: 24
End: 2024-09-21
Payer: COMMERCIAL

## 2024-09-21 VITALS
WEIGHT: 222.04 LBS | BODY MASS INDEX: 30.08 KG/M2 | SYSTOLIC BLOOD PRESSURE: 102 MMHG | OXYGEN SATURATION: 97 % | DIASTOLIC BLOOD PRESSURE: 80 MMHG | HEIGHT: 72 IN | TEMPERATURE: 97.3 F | RESPIRATION RATE: 16 BRPM | HEART RATE: 86 BPM

## 2024-09-21 DIAGNOSIS — L60.0 INGROWN TOENAIL: ICD-10-CM

## 2024-09-21 PROCEDURE — 1125F AMNT PAIN NOTED PAIN PRSNT: CPT | Performed by: FAMILY MEDICINE

## 2024-09-21 PROCEDURE — 3074F SYST BP LT 130 MM HG: CPT | Performed by: FAMILY MEDICINE

## 2024-09-21 PROCEDURE — 3079F DIAST BP 80-89 MM HG: CPT | Performed by: FAMILY MEDICINE

## 2024-09-21 PROCEDURE — 99213 OFFICE O/P EST LOW 20 MIN: CPT | Performed by: FAMILY MEDICINE

## 2024-09-21 RX ORDER — CEPHALEXIN 500 MG/1
500 CAPSULE ORAL 3 TIMES DAILY
Qty: 15 CAPSULE | Refills: 0 | Status: SHIPPED | OUTPATIENT
Start: 2024-09-21 | End: 2024-09-26

## 2024-09-21 ASSESSMENT — PAIN SCALES - GENERAL: PAINLEVEL: 2=MINIMAL-SLIGHT

## 2024-09-21 NOTE — PROGRESS NOTES
Subjective:      24 y.o. male presents to urgent care for left ingrown toenail that has been present for approximately 1 to 2 weeks.  He does have associated pain that comes intermittently, with a lot of walking, currently rated 6/10.  He has not yet tried anything for his symptoms.  He remains afebrile.    He denies any other questions or concerns at this time.    Current problem list, medication, and past medical/surgical history were reviewed in Epic.    ROS  See HPI     Objective:      /80 (BP Location: Right arm, Patient Position: Sitting, BP Cuff Size: Large adult)   Pulse 86   Temp 36.3 °C (97.3 °F) (Temporal)   Resp 16   Ht 1.829 m (6')   Wt 101 kg (222 lb 0.6 oz)   SpO2 97%   BMI 30.11 kg/m²     Physical Exam  Constitutional:       General: He is not in acute distress.     Appearance: He is not diaphoretic.   Cardiovascular:      Rate and Rhythm: Normal rate and regular rhythm.      Heart sounds: Normal heart sounds.   Pulmonary:      Effort: Pulmonary effort is normal. No respiratory distress.      Breath sounds: Normal breath sounds.   Skin:     Comments: Medial aspect of left great toe is ingrown with purulent drainage   Neurological:      Mental Status: He is alert.   Psychiatric:         Mood and Affect: Affect normal.         Judgment: Judgment normal.       Assessment/Plan:     1. Ingrown toenail  He was encouraged to use Epsom salt soaks 2 times daily.  Prescription for Keflex has been sent.  Tylenol and ibuprofen as needed.  - cephALEXin (KEFLEX) 500 MG Cap; Take 1 Capsule by mouth 3 times a day for 5 days.  Dispense: 15 Capsule; Refill: 0      Instructed to return to Urgent Care or nearest Emergency Department if symptoms fail to improve, for any change in condition, further concerns, or new concerning symptoms. Patient states understanding of the plan of care and discharge instructions.    Xochilt Peralta M.D.

## 2025-01-19 ENCOUNTER — OFFICE VISIT (OUTPATIENT)
Dept: URGENT CARE | Facility: PHYSICIAN GROUP | Age: 25
End: 2025-01-19
Payer: COMMERCIAL

## 2025-01-19 VITALS
BODY MASS INDEX: 30.69 KG/M2 | OXYGEN SATURATION: 100 % | HEART RATE: 89 BPM | TEMPERATURE: 98.9 F | DIASTOLIC BLOOD PRESSURE: 72 MMHG | WEIGHT: 226.6 LBS | SYSTOLIC BLOOD PRESSURE: 116 MMHG | RESPIRATION RATE: 18 BRPM | HEIGHT: 72 IN

## 2025-01-19 DIAGNOSIS — L60.0 INGROWN TOENAIL OF LEFT FOOT WITH INFECTION: ICD-10-CM

## 2025-01-19 PROCEDURE — 99213 OFFICE O/P EST LOW 20 MIN: CPT

## 2025-01-19 PROCEDURE — 3074F SYST BP LT 130 MM HG: CPT

## 2025-01-19 PROCEDURE — 3078F DIAST BP <80 MM HG: CPT

## 2025-01-19 RX ORDER — DOXYCYCLINE 100 MG/1
100 CAPSULE ORAL 2 TIMES DAILY
Qty: 14 CAPSULE | Refills: 0 | Status: SHIPPED | OUTPATIENT
Start: 2025-01-19 | End: 2025-01-26

## 2025-01-19 ASSESSMENT — ENCOUNTER SYMPTOMS: FEVER: 0

## 2025-01-19 NOTE — PROGRESS NOTES
Subjective:     CHIEF COMPLAINT  Chief Complaint   Patient presents with    Ingrown Toenail     Ingrown toenail on set 7-weeks  Lt Big toe        HPI  Elvis Cordova Jr. is a very pleasant 24 y.o. male who presents with swelling and pain surrounding the medial and lateral nail fold of his great toenail that has been present for approximately 7 weeks.  He started to notice purulent drainage, but reports his pain is overall mild.  He previously had an infection to this toenail in September as well that was treated with cephalexin.  He has not had any fevers.  He reports a lot of range of motion to the toe.  He is otherwise feeling well.    REVIEW OF SYSTEMS  Review of Systems   Constitutional:  Negative for fever.       PAST MEDICAL HISTORY  Patient Active Problem List    Diagnosis Date Noted    H/O viral illness 06/26/2023    Mild intermittent asthma without complication 09/09/2022       SURGICAL HISTORY  patient denies any surgical history    ALLERGIES  No Known Allergies    CURRENT MEDICATIONS  Home Medications       Reviewed by Amanda Laughlin P.A.-C. (Physician Assistant) on 01/19/25 at 1139  Med List Status: <None>     Medication Last Dose Status   albuterol 108 (90 Base) MCG/ACT Aero Soln inhalation aerosol PRN Active                    SOCIAL HISTORY  Social History     Tobacco Use    Smoking status: Never    Smokeless tobacco: Never   Vaping Use    Vaping status: Never Used   Substance and Sexual Activity    Alcohol use: Never    Drug use: Never    Sexual activity: Never       FAMILY HISTORY  Family History   Problem Relation Age of Onset    Hyperlipidemia Mother     Hypertension Father     Cancer Neg Hx     Psychiatric Illness Neg Hx     Diabetes Neg Hx     Heart Disease Neg Hx     Stroke Neg Hx           Objective:     VITAL SIGNS: /72 (BP Location: Right arm, Patient Position: Sitting, BP Cuff Size: Adult long)   Pulse 89   Temp 37.2 °C (98.9 °F) (Temporal)   Resp 18   Ht 1.829 m (6')   Wt  103 kg (226 lb 9.6 oz)   SpO2 100%   BMI 30.73 kg/m²     PHYSICAL EXAM  Physical Exam  Vitals reviewed.   Constitutional:       General: He is not in acute distress.     Appearance: Normal appearance. He is not ill-appearing or toxic-appearing.   HENT:      Head: Normocephalic and atraumatic.      Mouth/Throat:      Mouth: Mucous membranes are moist.   Eyes:      Conjunctiva/sclera: Conjunctivae normal.      Pupils: Pupils are equal, round, and reactive to light.   Pulmonary:      Effort: Pulmonary effort is normal. No respiratory distress.   Musculoskeletal:        Feet:    Feet:      Left foot:      Skin integrity: Erythema and warmth present.      Toenail Condition: Left toenails are ingrown.      Comments: Erythema, swelling, and tenderness to palpation on lateral and medial nail fold of L great toe. Spontaneous drainage or purulent material. Full active ROM in toe to flexion and extension.   Skin:     General: Skin is warm and dry.   Neurological:      General: No focal deficit present.      Mental Status: He is alert and oriented to person, place, and time.   Psychiatric:         Mood and Affect: Mood normal.       Assessment/Plan:     1. Ingrown toenail of left foot with infection  - Referral to Podiatry  - doxycycline (MONODOX) 100 MG capsule; Take 1 Capsule by mouth 2 times a day for 7 days.  Dispense: 14 Capsule; Refill: 0  -Warm salt water soaks 1-3 times daily for 5-10 minutes  -Keep toenail clean. Wash with warm soap and water  -Return to clinic if symptoms worsen or fail to resolve prior to seeing podiatry    MDM/Comments:  Patient has stable vital signs and is non-toxic appearing.  Patient has an infected ingrown toenail to the left great toe and has been initiated on a 7-day course of doxycycline.  A referral has been placed to podiatry.  Discussed supportive care measures with warm salt water soaks and keeping the toenail clean and dry.  Patient demonstrated understanding of treatment plan at this  time and will RTC if symptoms worsen or fail to resolve.     Differential diagnosis, natural history, supportive care, and indications for immediate follow-up discussed. All questions answered. Patient agrees with the plan of care.    Follow-up as needed if symptoms worsen or fail to improve to PCP, Urgent care or Emergency Room.    I have personally reviewed prior external notes and test results pertinent to today's visit.  I have independently reviewed and interpreted all diagnostics ordered during this urgent care acute visit.   Discussed management options (risks,benefits, and alternatives to treatment). Pt expresses understanding and the treatment plan was agreed upon. Questions were encouraged and answered to pt's satisfaction.    Please note that this dictation was created using voice recognition software. I have made a reasonable attempt to correct obvious errors, but I expect that there are errors of grammar and possibly content that I did not discover before finalizing the note.

## 2025-01-19 NOTE — LETTER
January 19, 2025    To Whom It May Concern:         This is confirmation that Elvis Tasha Yoder attended his scheduled appointment with Amanda Laughlin P.A.-C. on 1/19/25. Please excuse his absence from work Tuesday and Wednesday. He may return Thursday.          If you have any questions please do not hesitate to call me at the phone number listed below.    Sincerely,          Amanda Laughlin P.A.-C.  [phone]

## 2025-07-21 ENCOUNTER — OFFICE VISIT (OUTPATIENT)
Dept: URGENT CARE | Facility: PHYSICIAN GROUP | Age: 25
End: 2025-07-21
Payer: COMMERCIAL

## 2025-07-21 ENCOUNTER — HOSPITAL ENCOUNTER (OUTPATIENT)
Dept: LAB | Facility: MEDICAL CENTER | Age: 25
End: 2025-07-21
Attending: STUDENT IN AN ORGANIZED HEALTH CARE EDUCATION/TRAINING PROGRAM
Payer: COMMERCIAL

## 2025-07-21 VITALS
HEART RATE: 100 BPM | WEIGHT: 214.4 LBS | DIASTOLIC BLOOD PRESSURE: 80 MMHG | HEIGHT: 72 IN | RESPIRATION RATE: 20 BRPM | SYSTOLIC BLOOD PRESSURE: 112 MMHG | TEMPERATURE: 96.9 F | BODY MASS INDEX: 29.04 KG/M2 | OXYGEN SATURATION: 98 %

## 2025-07-21 DIAGNOSIS — R20.1 HYPOESTHESIA: ICD-10-CM

## 2025-07-21 DIAGNOSIS — R07.9 CHEST PAIN, UNSPECIFIED TYPE: ICD-10-CM

## 2025-07-21 LAB
ALBUMIN SERPL BCP-MCNC: 4.7 G/DL (ref 3.2–4.9)
ALBUMIN/GLOB SERPL: 1.5 G/DL
ALP SERPL-CCNC: 81 U/L (ref 30–99)
ALT SERPL-CCNC: 9 U/L (ref 2–50)
ANION GAP SERPL CALC-SCNC: 11 MMOL/L (ref 7–16)
AST SERPL-CCNC: 19 U/L (ref 12–45)
BASOPHILS # BLD AUTO: 0.6 % (ref 0–1.8)
BASOPHILS # BLD: 0.05 K/UL (ref 0–0.12)
BILIRUB SERPL-MCNC: 0.5 MG/DL (ref 0.1–1.5)
BUN SERPL-MCNC: 17 MG/DL (ref 8–22)
CALCIUM ALBUM COR SERPL-MCNC: 9.3 MG/DL (ref 8.5–10.5)
CALCIUM SERPL-MCNC: 9.9 MG/DL (ref 8.5–10.5)
CHLORIDE SERPL-SCNC: 105 MMOL/L (ref 96–112)
CO2 SERPL-SCNC: 22 MMOL/L (ref 20–33)
CREAT SERPL-MCNC: 1.15 MG/DL (ref 0.5–1.4)
EOSINOPHIL # BLD AUTO: 0.05 K/UL (ref 0–0.51)
EOSINOPHIL NFR BLD: 0.6 % (ref 0–6.9)
ERYTHROCYTE [DISTWIDTH] IN BLOOD BY AUTOMATED COUNT: 38.4 FL (ref 35.9–50)
EST. AVERAGE GLUCOSE BLD GHB EST-MCNC: 100 MG/DL
GFR SERPLBLD CREATININE-BSD FMLA CKD-EPI: 91 ML/MIN/1.73 M 2
GLOBULIN SER CALC-MCNC: 3.1 G/DL (ref 1.9–3.5)
GLUCOSE SERPL-MCNC: 97 MG/DL (ref 65–99)
HBA1C MFR BLD: 5.1 % (ref 4–5.6)
HCT VFR BLD AUTO: 47.8 % (ref 42–52)
HGB BLD-MCNC: 16.8 G/DL (ref 14–18)
IMM GRANULOCYTES # BLD AUTO: 0.03 K/UL (ref 0–0.11)
IMM GRANULOCYTES NFR BLD AUTO: 0.4 % (ref 0–0.9)
LYMPHOCYTES # BLD AUTO: 1.75 K/UL (ref 1–4.8)
LYMPHOCYTES NFR BLD: 22.7 % (ref 22–41)
MCH RBC QN AUTO: 30 PG (ref 27–33)
MCHC RBC AUTO-ENTMCNC: 35.1 G/DL (ref 32.3–36.5)
MCV RBC AUTO: 85.4 FL (ref 81.4–97.8)
MONOCYTES # BLD AUTO: 0.56 K/UL (ref 0–0.85)
MONOCYTES NFR BLD AUTO: 7.3 % (ref 0–13.4)
NEUTROPHILS # BLD AUTO: 5.28 K/UL (ref 1.82–7.42)
NEUTROPHILS NFR BLD: 68.4 % (ref 44–72)
NRBC # BLD AUTO: 0 K/UL
NRBC BLD-RTO: 0 /100 WBC (ref 0–0.2)
PLATELET # BLD AUTO: 282 K/UL (ref 164–446)
PMV BLD AUTO: 10.9 FL (ref 9–12.9)
POTASSIUM SERPL-SCNC: 4.7 MMOL/L (ref 3.6–5.5)
PROT SERPL-MCNC: 7.8 G/DL (ref 6–8.2)
RBC # BLD AUTO: 5.6 M/UL (ref 4.7–6.1)
SODIUM SERPL-SCNC: 138 MMOL/L (ref 135–145)
TSH SERPL DL<=0.005 MIU/L-ACNC: 1.84 UIU/ML (ref 0.38–5.33)
VIT B12 SERPL-MCNC: 465 PG/ML (ref 211–911)
WBC # BLD AUTO: 7.7 K/UL (ref 4.8–10.8)

## 2025-07-21 PROCEDURE — 93000 ELECTROCARDIOGRAM COMPLETE: CPT | Performed by: STUDENT IN AN ORGANIZED HEALTH CARE EDUCATION/TRAINING PROGRAM

## 2025-07-21 PROCEDURE — 36415 COLL VENOUS BLD VENIPUNCTURE: CPT

## 2025-07-21 PROCEDURE — 99214 OFFICE O/P EST MOD 30 MIN: CPT | Performed by: STUDENT IN AN ORGANIZED HEALTH CARE EDUCATION/TRAINING PROGRAM

## 2025-07-21 PROCEDURE — 85025 COMPLETE CBC W/AUTO DIFF WBC: CPT

## 2025-07-21 PROCEDURE — 84443 ASSAY THYROID STIM HORMONE: CPT

## 2025-07-21 PROCEDURE — 80053 COMPREHEN METABOLIC PANEL: CPT

## 2025-07-21 PROCEDURE — 82607 VITAMIN B-12: CPT

## 2025-07-21 PROCEDURE — 3079F DIAST BP 80-89 MM HG: CPT | Performed by: STUDENT IN AN ORGANIZED HEALTH CARE EDUCATION/TRAINING PROGRAM

## 2025-07-21 PROCEDURE — 3074F SYST BP LT 130 MM HG: CPT | Performed by: STUDENT IN AN ORGANIZED HEALTH CARE EDUCATION/TRAINING PROGRAM

## 2025-07-21 PROCEDURE — 83036 HEMOGLOBIN GLYCOSYLATED A1C: CPT

## 2025-07-21 NOTE — LETTER
[facility] [place]  [address]  [place] [address]     July 21, 2025    Patient: Elvis Cordova Jr.   YOB: 2000   Date of Visit: 7/21/2025       To Whom It May Concern:    Elvis Cordova was seen and treated in our department on 7/21/2025.  Please excuse from work today.    Sincerely,     Saroj Watkins D.O.

## 2025-07-21 NOTE — PROGRESS NOTES
Subjective:   CHIEF COMPLAINT  Chief Complaint   Patient presents with    Numbness     On both legs and hands specially on pinky fingers, little shortness of breath     Chest Pain       HPI    History of Present Illness  Elvis Cordova Jr. is a 24 y.o. male who presents for evaluation of coldness and numbness in his legs. He is accompanied by his mother.    He began experiencing a cold sensation and numbness in his legs, particularly around the knees, at approximately 7:45 AM while at work. His legs did not give out, but he experienced buckling. The numbness also extended to both pinky fingers, with the left being more affected. His condition remains unchanged. He reports intermittent headaches and weakness in his legs, which he believes contributes to the buckling.  No spontaneous loss of bowels or bladder.  No saddle anesthesia.  No history of low back pain or lumbar surgery.  He was at work when symptoms developed but not doing anything physical.  He has no chronic medical conditions and is not on any medications. He has a primary care physician in this building.    He reports no recent colds. He reports no concerns about sexually transmitted diseases and reports he is never had sex no change in visual acuity.  Developed an itchy pruritic rash approximately 2 weeks ago along the ulnar margins palm of his right hand and some similar lesions along his right shin which has improved.  Denies any changes in visual acuity.  Denies experiencing heartburn.     Of note last night while going to bed patient reported experiencing some sharp chest pain which lasted for approximately 1.5 hours which has resolved.  No current chest pain.  No history of exertional chest pain or exertional syncope.  Not feeling short of breath.            FAMILY HISTORY  He has a family history of diabetes and strokes.        REVIEW OF SYSTEMS  General: no fever or chills  GI: no nausea or vomiting  See HPI for further details.    PAST MEDICAL  HISTORY  Patient Active Problem List    Diagnosis Date Noted    H/O viral illness 06/26/2023    Mild intermittent asthma without complication 09/09/2022       SURGICAL HISTORY  patient denies any surgical history    ALLERGIES  Allergies[1]    CURRENT MEDICATIONS  Home Medications       Reviewed by Saroj Watkins D.O. (Physician) on 07/21/25 at 0950  Med List Status: <None>     Medication Last Dose Status   albuterol 108 (90 Base) MCG/ACT Aero Soln inhalation aerosol PRN Active                    SOCIAL HISTORY  Social History     Tobacco Use    Smoking status: Never    Smokeless tobacco: Never   Vaping Use    Vaping status: Never Used   Substance and Sexual Activity    Alcohol use: Never    Drug use: Never    Sexual activity: Never       FAMILY HISTORY  Family History   Problem Relation Age of Onset    Hyperlipidemia Mother     Hypertension Father     Cancer Neg Hx     Psychiatric Illness Neg Hx     Diabetes Neg Hx     Heart Disease Neg Hx     Stroke Neg Hx           Objective:   PHYSICAL EXAM  VITAL SIGNS: /80 (BP Location: Right arm, Patient Position: Sitting, BP Cuff Size: Adult)   Pulse 100   Temp 36.1 °C (96.9 °F) (Temporal)   Resp 20   Ht 1.829 m (6')   Wt 97.3 kg (214 lb 6.4 oz)   SpO2 98%   BMI 29.08 kg/m²       Gen: no acute distress, normal voice  Skin: dry, intact, moist mucosal membranes.  2 circumferential erythematous, healing lesions along the radial aspect palm of the right hand.  Similar lesions along the right shin.  No excoriations.  No subcutaneous extension.  No vesicular eruption.   Eyes: No conjunctival injection bilaterally.  Neck: Normal range of motion. No meningeal signs.   ENT: No oropharyngeal erythema or exudates.  Uvula midline.  Lungs: No increased work of breathing.  CTAB w/ symmetric expansion  CV: RRR w/o murmurs or clicks  Neuro: Speech: conversant, fluent, no aphasia  Mental status: AAOx3  Gait: normal   CN: 2-12 grossly intact  Sensory exam: globally  intact  Motor exam: no global deficits   No nystagmus  No finger to nose dysmetria  Normal heel-to-toe  Psych: normal affect, normal judgement, alert, awake    EC2025  82 bpm.  Sinus rhythm.  Borderline RAD with QRS at 90   No P wave abnormalities  no ST segment elevation or depression  No T wave abnormalities  No previous ECGs for comparison  No acute ischemic changes or pathologic Q waves      Assessment/Plan:     1. Hypoesthesia  VITAMIN B12    COMP METABOLIC PANEL    CBC WITH DIFFERENTIAL    HEMOGLOBIN A1C    TSH WITH REFLEX TO FT4    EKG - Clinic Performed      2. Chest pain, unspecified type  EKG - Clinic Performed      1) ? Etiology.  Symptoms have been ongoing for only a couple hours.  No trauma or injury.  History and exam was nonrevealing.  Will follow-up with labs for additional evaluation and instructed to schedule follow-up appoint with primary care.  Certainly if develop any new or worsening symptoms return to urgent care as needed.  MOC and the patient assertively discussed and all questions were answered.  -Ordered vitamin B12  -Ordered CBC  -Ordered CMP  -Ordered A1c  -Ordered TSH    2) resolved.  History and ECG not suggestive of ischemic etiology.  Will need to follow-up with primary care for further evaluation. Return to urgent care any new/worsening symptoms or further questions or concerns.  Patient understood everything discussed.  All questions were answered.        Please note that this dictation was created using voice recognition software. I have made a reasonable attempt to correct obvious errors, but I expect that there are errors of grammar and possibly content that I did not discover before finalizing the note.              [1] No Known Allergies

## 2025-07-22 ENCOUNTER — OFFICE VISIT (OUTPATIENT)
Dept: MEDICAL GROUP | Facility: PHYSICIAN GROUP | Age: 25
End: 2025-07-22
Payer: COMMERCIAL

## 2025-07-22 VITALS
SYSTOLIC BLOOD PRESSURE: 108 MMHG | HEIGHT: 74 IN | HEART RATE: 76 BPM | OXYGEN SATURATION: 96 % | DIASTOLIC BLOOD PRESSURE: 78 MMHG | TEMPERATURE: 97.5 F | RESPIRATION RATE: 16 BRPM | BODY MASS INDEX: 27.46 KG/M2 | WEIGHT: 214 LBS

## 2025-07-22 DIAGNOSIS — J45.20 MILD INTERMITTENT ASTHMA WITHOUT COMPLICATION: Primary | ICD-10-CM

## 2025-07-22 PROCEDURE — 99213 OFFICE O/P EST LOW 20 MIN: CPT

## 2025-07-22 PROCEDURE — 3078F DIAST BP <80 MM HG: CPT

## 2025-07-22 PROCEDURE — 3074F SYST BP LT 130 MM HG: CPT

## 2025-07-22 RX ORDER — ALBUTEROL SULFATE 90 UG/1
2 INHALANT RESPIRATORY (INHALATION) EVERY 6 HOURS PRN
Qty: 8.5 G | Refills: 3 | Status: SHIPPED | OUTPATIENT
Start: 2025-07-22

## 2025-07-22 ASSESSMENT — ENCOUNTER SYMPTOMS
SENSORY CHANGE: 1
WEAKNESS: 1
ABDOMINAL PAIN: 0
CHILLS: 1

## 2025-07-22 ASSESSMENT — FIBROSIS 4 INDEX: FIB4 SCORE: 0.52

## 2025-07-22 ASSESSMENT — PATIENT HEALTH QUESTIONNAIRE - PHQ9: CLINICAL INTERPRETATION OF PHQ2 SCORE: 0

## 2025-07-22 NOTE — LETTER
July 22, 2025    To Whom It May Concern:         This is confirmation that Elvis Cordova Jr. attended his scheduled appointment with BEVERLY Saunders on 7/22/25.       Please excuse missed work days due to acute symptoms. I recommend return to work without restrictions 7/24/25.         If you have any questions please do not hesitate to call me at the phone number listed below.    Sincerely,          DEMI Saunders.  [phone]

## 2025-07-22 NOTE — PROGRESS NOTES
Verbal consent was acquired by the patient to use [facility] ambient listening note generation during this visit     Subjective:     CC: The encounter diagnosis was Mild intermittent asthma without complication.    HPI:   Elvis presents today with    History of Present Illness  The patient presents with leg numbness and chest pain.    Leg Numbness  - Reports increased cold sensitivity, leg movement difficulty, and sudden numbness in his pinky finger extending to his knees on 07/21/2025, which persisted during an ER visit but now resolved.  - Experienced leg weakness and walking difficulty for 2 days.  - Felt anxious with leg numbness and coldness.  - Noticed simultaneous hot and cold sensations after a doctor's appointment, with a temperature of 97°F.    Chest Pain  - Recalls sharp chest pain around midnight on 07/20/2025, with arm and leg weakness, disrupting sleep until 2 AM.  - Difficulty breathing occurs only during chest pain episodes, which are infrequent and typically upon waking.    Rashes  - Noticed three itchy rashes on his leg 1-2 weeks ago, now ignored.    Requests inhaler refill.    No new chemical exposure at work, no recent illness, or contact with sick individuals. No STI symptoms, cough, breathing changes, asthma flare-ups, reflux, heartburn, allergies, bowel changes, abdominal pain, or back pain. He takes Airborne supplements and has no viral symptoms.    Social History:  Occupations: School maintenance  Diet: Rarely consumes spicy food  Sleep: Disrupted sleep patterns due to work shift changes  Sexual Practices: No STI concerns    Current Medications[1]    Problem   Mild Intermittent Asthma Without Complication         ROS: negative except for as documented below, in HPI, Assessment and Plan  Review of Systems   Constitutional:  Positive for chills and malaise/fatigue.   Cardiovascular:  Positive for chest pain.   Gastrointestinal:  Negative for abdominal pain.   Neurological:  Positive for  "sensory change and weakness.   Endo/Heme/Allergies:         Feeling cold all over, absence of fever   All other systems reviewed and are negative.      Objective:     Exam:  /78 (BP Location: Left arm, Patient Position: Sitting, BP Cuff Size: Adult)   Pulse 76   Temp 36.4 °C (97.5 °F) (Temporal)   Resp 16   Ht 1.88 m (6' 2.02\")   Wt 97.1 kg (214 lb)   SpO2 96%   BMI 27.46 kg/m²  Body mass index is 27.46 kg/m².    Physical Exam  Vitals reviewed.   Constitutional:       General: He is not in acute distress.     Appearance: Normal appearance. He is obese. He is not ill-appearing.   HENT:      Head: Normocephalic and atraumatic.      Right Ear: Tympanic membrane, ear canal and external ear normal.      Left Ear: Tympanic membrane, ear canal and external ear normal.      Nose: Nose normal.      Mouth/Throat:      Mouth: Mucous membranes are moist.      Pharynx: Oropharynx is clear.   Eyes:      Extraocular Movements: Extraocular movements intact.      Conjunctiva/sclera: Conjunctivae normal.      Pupils: Pupils are equal, round, and reactive to light.   Neck:      Thyroid: No thyromegaly.      Trachea: Trachea normal.   Cardiovascular:      Rate and Rhythm: Normal rate and regular rhythm.      Pulses: Normal pulses.      Heart sounds: Normal heart sounds. No murmur heard.  Pulmonary:      Effort: Pulmonary effort is normal. No respiratory distress.      Breath sounds: Normal breath sounds. No wheezing.   Abdominal:      General: Bowel sounds are normal. There is no distension.      Palpations: Abdomen is soft.      Tenderness: There is no abdominal tenderness. There is no right CVA tenderness or left CVA tenderness.   Musculoskeletal:         General: No swelling, tenderness or deformity. Normal range of motion.      Cervical back: Full passive range of motion without pain.   Lymphadenopathy:      Cervical: No cervical adenopathy.   Skin:     General: Skin is warm and dry.      Capillary Refill: Capillary " refill takes less than 2 seconds.      Findings: No rash.   Neurological:      General: No focal deficit present.      Mental Status: He is alert and oriented to person, place, and time.      Cranial Nerves: No cranial nerve deficit.      Sensory: No sensory deficit.      Motor: No weakness.   Psychiatric:         Mood and Affect: Mood normal.         Behavior: Behavior normal.         Labs:    Latest Reference Range & Units 07/21/25 00:00 07/21/25 10:34   WBC 4.8 - 10.8 K/uL  7.7   RBC 4.70 - 6.10 M/uL  5.60   Hemoglobin 14.0 - 18.0 g/dL  16.8   Hematocrit 42.0 - 52.0 %  47.8   MCV 81.4 - 97.8 fL  85.4   MCH 27.0 - 33.0 pg  30.0   MCHC 32.3 - 36.5 g/dL  35.1   RDW 35.9 - 50.0 fL  38.4   Platelet Count 164 - 446 K/uL  282   MPV 9.0 - 12.9 fL  10.9   Neutrophils-Polys 44.00 - 72.00 %  68.40   Neutrophils (Absolute) 1.82 - 7.42 K/uL  5.28   Lymphocytes 22.00 - 41.00 %  22.70   Lymphs (Absolute) 1.00 - 4.80 K/uL  1.75   Monocytes 0.00 - 13.40 %  7.30   Monos (Absolute) 0.00 - 0.85 K/uL  0.56   Eosinophils 0.00 - 6.90 %  0.60   Eos (Absolute) 0.00 - 0.51 K/uL  0.05   Basophils 0.00 - 1.80 %  0.60   Baso (Absolute) 0.00 - 0.12 K/uL  0.05   Immature Granulocytes 0.00 - 0.90 %  0.40   Immature Granulocytes (abs) 0.00 - 0.11 K/uL  0.03   Nucleated RBC 0.00 - 0.20 /100 WBC  0.00   NRBC (Absolute) K/uL  0.00   Sodium 135 - 145 mmol/L  138   Potassium 3.6 - 5.5 mmol/L  4.7   Chloride 96 - 112 mmol/L  105   Co2 20 - 33 mmol/L  22   Anion Gap 7.0 - 16.0   11.0   Glucose 65 - 99 mg/dL  97   Bun 8 - 22 mg/dL  17   Creatinine 0.50 - 1.40 mg/dL  1.15   GFR (CKD-EPI) >60 mL/min/1.73 m 2  91   Calcium 8.5 - 10.5 mg/dL  9.9   Correct Calcium 8.5 - 10.5 mg/dL  9.3   AST(SGOT) 12 - 45 U/L  19   ALT(SGPT) 2 - 50 U/L  9   Alkaline Phosphatase 30 - 99 U/L  81   Total Bilirubin 0.1 - 1.5 mg/dL  0.5   Albumin 3.2 - 4.9 g/dL  4.7   Total Protein 6.0 - 8.2 g/dL  7.8   Globulin 1.9 - 3.5 g/dL  3.1   A-G Ratio g/dL  1.5   Glycohemoglobin 4.0 -  5.6 %  5.1   Estim. Avg Glu mg/dL  100   Vitamin B12 -True Cobalamin 211 - 911 pg/mL  465   TSH 0.380 - 5.330 uIU/mL  1.840   EKG  Rpt    Rpt: View report in Results Review for more information    Assessment & Plan:     24 y.o. male with the following -     Assessment & Plan  1. Numbness in legs: Resolved.  - Advise rest and hydration.  - Provide work note for return to work on 07/24/2025 or 07/25/2025.    2. Chest pain: Intermittent.  - Monitor symptoms.  - Seek further evaluation if pain persists or worsens.    3. Asthma.  - Refill inhaler prescription.    4. Skin rashes.  - No treatment necessary.    Follow-up  - Return to work on 07/24/2025 or 07/25/2025.    Problem List Items Addressed This Visit       Mild intermittent asthma without complication - Primary    Chronic, stable. Using albuterol as needed for symptom flare, denies recent flares.         Relevant Medications    albuterol 108 (90 Base) MCG/ACT Aero Soln inhalation aerosol     Patient was educated in proper administration of medication(s) ordered today including safety, possible SE, risks, benefits, rationale and alternatives to therapy.   Supportive care, differential diagnoses, and indications for immediate follow-up discussed with patient.    Pathogenesis of diagnosis discussed including typical length and natural progression.    Instructed to return to clinic or nearest emergency department for any change in condition, further concerns, or worsening of symptoms.  Patient states understanding of the plan of care and discharge instructions.    Return if symptoms worsen or fail to improve.    Please note that this dictation was created using voice recognition software. I have made every reasonable attempt to correct obvious errors, but I expect that there are errors of grammar and possibly content that I did not discover before finalizing the note.             [1]   Current Outpatient Medications   Medication Sig Dispense Refill    albuterol 108 (90  Base) MCG/ACT Aero Soln inhalation aerosol Inhale 2 Puffs every 6 hours as needed for Shortness of Breath. 8.5 g 3     No current facility-administered medications for this visit.